# Patient Record
Sex: MALE
[De-identification: names, ages, dates, MRNs, and addresses within clinical notes are randomized per-mention and may not be internally consistent; named-entity substitution may affect disease eponyms.]

---

## 2017-01-24 ENCOUNTER — APPOINTMENT (OUTPATIENT)
Dept: ENDOCRINOLOGY | Facility: CLINIC | Age: 52
End: 2017-01-24

## 2017-01-24 VITALS
HEIGHT: 60 IN | BODY MASS INDEX: 27.29 KG/M2 | HEART RATE: 75 BPM | WEIGHT: 139 LBS | SYSTOLIC BLOOD PRESSURE: 127 MMHG | DIASTOLIC BLOOD PRESSURE: 83 MMHG

## 2017-01-24 DIAGNOSIS — C73 MALIGNANT NEOPLASM OF THYROID GLAND: ICD-10-CM

## 2017-01-24 DIAGNOSIS — E89.0 POSTPROCEDURAL HYPOTHYROIDISM: ICD-10-CM

## 2017-01-24 DIAGNOSIS — Z82.49 FAMILY HISTORY OF ISCHEMIC HEART DISEASE AND OTHER DISEASES OF THE CIRCULATORY SYSTEM: ICD-10-CM

## 2017-01-26 LAB
ALBUMIN SERPL ELPH-MCNC: 4.7 G/DL
ALP BLD-CCNC: 76 U/L
ALT SERPL-CCNC: 22 U/L
ANION GAP SERPL CALC-SCNC: 16 MMOL/L
AST SERPL-CCNC: 17 U/L
BILIRUB SERPL-MCNC: 0.7 MG/DL
BUN SERPL-MCNC: 14 MG/DL
CALCIUM SERPL-MCNC: 9.7 MG/DL
CHLORIDE SERPL-SCNC: 100 MMOL/L
CHOLEST SERPL-MCNC: 236 MG/DL
CHOLEST/HDLC SERPL: 4.9 RATIO
CO2 SERPL-SCNC: 26 MMOL/L
CREAT SERPL-MCNC: 0.93 MG/DL
GLUCOSE SERPL-MCNC: 90 MG/DL
HBA1C MFR BLD HPLC: 5.7 %
HDLC SERPL-MCNC: 48 MG/DL
LDLC SERPL CALC-MCNC: 158 MG/DL
POTASSIUM SERPL-SCNC: 4.5 MMOL/L
PROT SERPL-MCNC: 7.2 G/DL
SODIUM SERPL-SCNC: 142 MMOL/L
THYROGLOB AB SERPL-ACNC: <20 IU/ML
THYROGLOB SERPL-MCNC: <0.2 NG/ML
TRIGL SERPL-MCNC: 150 MG/DL
TSH SERPL-ACNC: 7.02 UIU/ML

## 2017-05-09 ENCOUNTER — MEDICATION RENEWAL (OUTPATIENT)
Age: 52
End: 2017-05-09

## 2018-04-30 ENCOUNTER — RX RENEWAL (OUTPATIENT)
Age: 53
End: 2018-04-30

## 2018-06-12 ENCOUNTER — APPOINTMENT (OUTPATIENT)
Dept: ENDOCRINOLOGY | Facility: CLINIC | Age: 53
End: 2018-06-12
Payer: COMMERCIAL

## 2018-06-12 VITALS
HEIGHT: 60 IN | SYSTOLIC BLOOD PRESSURE: 124 MMHG | BODY MASS INDEX: 27.48 KG/M2 | DIASTOLIC BLOOD PRESSURE: 86 MMHG | HEART RATE: 73 BPM | WEIGHT: 140 LBS

## 2018-06-12 PROCEDURE — 99213 OFFICE O/P EST LOW 20 MIN: CPT

## 2018-06-14 LAB
THYROGLOB AB SERPL-ACNC: <20 IU/ML
THYROGLOB SERPL-MCNC: <0.2 NG/ML
TSH SERPL-ACNC: 5.23 UIU/ML

## 2019-10-03 ENCOUNTER — APPOINTMENT (OUTPATIENT)
Dept: ENDOCRINOLOGY | Facility: CLINIC | Age: 54
End: 2019-10-03
Payer: COMMERCIAL

## 2019-10-03 VITALS
DIASTOLIC BLOOD PRESSURE: 64 MMHG | WEIGHT: 142 LBS | BODY MASS INDEX: 27.88 KG/M2 | HEART RATE: 66 BPM | SYSTOLIC BLOOD PRESSURE: 107 MMHG | HEIGHT: 60 IN

## 2019-10-03 DIAGNOSIS — E78.00 PURE HYPERCHOLESTEROLEMIA, UNSPECIFIED: ICD-10-CM

## 2019-10-03 PROCEDURE — 99213 OFFICE O/P EST LOW 20 MIN: CPT

## 2019-10-03 RX ORDER — ROSUVASTATIN CALCIUM 20 MG/1
20 TABLET, FILM COATED ORAL
Qty: 90 | Refills: 0 | Status: ACTIVE | COMMUNITY
Start: 2019-09-05

## 2019-10-03 NOTE — ASSESSMENT
[FreeTextEntry1] : Hypothyroid, h/o thyroid cancer, likely cured, Low risk for recurrence. Does not need TSH suppression.\par Explained that hypothyroidism can contribute to hyperlipidemia, but when TSH is over 10, and his TSH has never been that high.  goal TSH 0.5-4.0 range, will adjust dose, if necessary.\par Hyperlipidemia,  will check lipids (Statin dose increased)\par RTO 1 year\par

## 2019-10-03 NOTE — PHYSICAL EXAM
[Alert] : alert [Healthy Appearance] : healthy appearance [Normal Voice/Communication] : normal voice communication [No Proptosis] : no proptosis [No Lid Lag] : no lid lag [Normal Hearing] : hearing was normal [No LAD] : no lymphadenopathy [Clear to Auscultation] : lungs were clear to auscultation bilaterally [Normal S1, S2] : normal S1 and S2 [Regular Rhythm] : with a regular rhythm [No Tremors] : no tremors [Normal Affect] : the affect was normal [Normal Mood] : the mood was normal [de-identified] : thyroid not palpable [de-identified] : mild acanthosis nigricans

## 2019-10-03 NOTE — HISTORY OF PRESENT ILLNESS
[Date: ______] : [unfilled] [ALEMAN] : patient was treated with radioactive iodine [FreeTextEntry1] : no health issues since last visit.  \par statin dose was increased.  he goes for stress test and calcium score every five years\par exercising/walking during his commute and then more exercise on weekends\par finding that a nap in the afternoon feels good\par weight has been stable\par no palpitations or feeling jittery \par declines flu vaccine\par \par \par Meds: levothyroxine 150mcg daily\par rosuvastatin 20mg [de-identified] : follicular carcinoma, capsular and vascular invasion present. 5 x 4.4 x 1.5 cm [de-identified] : left hemithyroidectomy [de-identified] : completion thyroidectomy [de-identified] : 1mm, incidental papillary [de-identified] : 4/2011 [de-identified] : 100 [de-identified] : \par 12/12: Tg < 0.20, Tg Ab < 20, TSH 13.61\par 10/13: Tg 0.31, Tg Ab < 20, TSH < 0.01\par 12/14: Tg < 0.20, Tg Ab < 20, TSH 3.22\par 10/15: Tg < 0.20, Tg Ab < 20, TSH 2.797\par 1/17: Tg < 0.20, Tg Ab , 20, TSH 7.02\par 6/18: Tg < 0.20, Tg Ab , 20< TSH 5.23 [de-identified] : \par post ALEMAN scan: focal uptake in neck c/w remnant tissue and no appreciable uptake elsewhere

## 2019-10-04 LAB
ANION GAP SERPL CALC-SCNC: 12 MMOL/L
BUN SERPL-MCNC: 11 MG/DL
CALCIUM SERPL-MCNC: 9.8 MG/DL
CHLORIDE SERPL-SCNC: 103 MMOL/L
CHOLEST SERPL-MCNC: 133 MG/DL
CHOLEST/HDLC SERPL: 3 RATIO
CO2 SERPL-SCNC: 28 MMOL/L
CREAT SERPL-MCNC: 0.9 MG/DL
GLUCOSE SERPL-MCNC: 96 MG/DL
HDLC SERPL-MCNC: 44 MG/DL
LDLC SERPL CALC-MCNC: 69 MG/DL
POTASSIUM SERPL-SCNC: 5 MMOL/L
SODIUM SERPL-SCNC: 143 MMOL/L
THYROGLOB AB SERPL-ACNC: <20 IU/ML
THYROGLOB SERPL-MCNC: <0.2 NG/ML
TRIGL SERPL-MCNC: 98 MG/DL
TSH SERPL-ACNC: 1.35 UIU/ML

## 2019-10-04 RX ORDER — PRAVASTATIN SODIUM 10 MG/1
10 TABLET ORAL
Refills: 0 | Status: DISCONTINUED | COMMUNITY
End: 2019-10-04

## 2020-09-21 ENCOUNTER — RX RENEWAL (OUTPATIENT)
Age: 55
End: 2020-09-21

## 2020-11-10 ENCOUNTER — APPOINTMENT (OUTPATIENT)
Dept: ENDOCRINOLOGY | Facility: CLINIC | Age: 55
End: 2020-11-10
Payer: COMMERCIAL

## 2020-11-10 VITALS — BODY MASS INDEX: 26.95 KG/M2 | WEIGHT: 138 LBS

## 2020-11-10 PROCEDURE — 99213 OFFICE O/P EST LOW 20 MIN: CPT | Mod: 95

## 2020-11-10 NOTE — DATA REVIEWED
[FreeTextEntry1] : 10/19: TSH 1.35, tot chol 133, trig 98, HDL 44, LDL 69, Tg < 0.20, Tg Ab , 20 \par 6/18: TSH 5.23, Tg < 0.20, Tg Ab , 20 \par 1/17: TSH 7.02, Tg < 0.20, Tg  Ab < 20, A1c 5.7%

## 2020-11-10 NOTE — ASSESSMENT
[FreeTextEntry1] : Hypothyroid, h/o thyroid cancer, likely cured, Low risk for recurrence. Does not need TSH suppression.\par goal TSH 0.5-3.5 range.\par pt wlll have labs from primary doctor forwarded to me.\par RTO 1 year\par

## 2020-11-10 NOTE — HISTORY OF PRESENT ILLNESS
[Home] : at home, [unfilled] , at the time of the visit. [Medical Office: ([facility])___] : at the medical office located in  [Verbal consent obtained from patient] : the patient, [unfilled] [ALEMAN] : patient was treated with radioactive iodine [FreeTextEntry1] : no health issues since last visit.  \par running and walking more during pandemic, [place]redrick in summer\par felt weight was increasing, so then started exercising more.  thinks weight is 138 lb\par no palpitations or feeling jittery \par skin is drier in the winter\par will get labs with Dr Petit next week\par got flu vaccine\par \par Meds: levothyroxine 150mcg daily\par rosuvastatin 20mg [de-identified] : 12/2010 [de-identified] : L hemithyroidectomy [de-identified] : follicular carcinoma, capsular and vascular invasion present. 5 x 4.4 x 1.5 cm  [de-identified] : 2/2011.  completion thyroidectomy.  Path: 1mm incidental papillary [de-identified] : 4/2011 [de-identified] : 100 [de-identified] : \par post ALEMAN scan: focal uptake in neck c/w remnant tissue and no appreciable uptake elsewhere.  [de-identified] : \par 12/12: Tg < 0.20, Tg Ab < 20, TSH 13.61\par 10/13: Tg 0.31, Tg Ab < 20, TSH < 0.01\par 12/14: Tg < 0.20, Tg Ab < 20, TSH 3.22\par 10/15: Tg < 0.20, Tg Ab < 20, TSH 2.797\par 1/17: Tg < 0.20, Tg Ab , 20, TSH 7.02\par 6/18: Tg < 0.20, Tg Ab , 20,  TSH 5.23 \par 10/19: Tg < 0.20, Tg Ab , 20, TSH 1.35\par  \par

## 2020-12-15 ENCOUNTER — RX RENEWAL (OUTPATIENT)
Age: 55
End: 2020-12-15

## 2021-12-03 ENCOUNTER — RX RENEWAL (OUTPATIENT)
Age: 56
End: 2021-12-03

## 2021-12-29 ENCOUNTER — APPOINTMENT (OUTPATIENT)
Dept: ENDOCRINOLOGY | Facility: CLINIC | Age: 56
End: 2021-12-29
Payer: COMMERCIAL

## 2021-12-29 VITALS — WEIGHT: 142 LBS | BODY MASS INDEX: 27.73 KG/M2

## 2021-12-29 PROCEDURE — 99213 OFFICE O/P EST LOW 20 MIN: CPT | Mod: 95

## 2021-12-29 NOTE — DATA REVIEWED
[FreeTextEntry1] : 11/21: TSH 8.13, free T4 1.22, tot chol 175, trig 116, HDL 50, , A1c 5.6%\par 11/20: TSH 19.7, free T4 1.20, tot chol 180, trig 151, HDL 45, , A1c 5.7%\par 10/19: TSH 1.35, tot chol 133, trig 98, HDL 44, LDL 69, Tg < 0.20, Tg Ab , 20 \par 6/18: TSH 5.23, Tg < 0.20, Tg Ab , 20 \par 1/17: TSH 7.02, Tg < 0.20, Tg  Ab < 20, A1c 5.7%

## 2021-12-29 NOTE — REASON FOR VISIT
[Home] : at home, [unfilled] , at the time of the visit. [Medical Office: ([facility])___] : at the medical office located in  [Verbal consent obtained from patient] : the patient, [unfilled] [Follow - Up] : a follow-up visit [Hypothyroidism] : hypothyroidism

## 2021-12-29 NOTE — ASSESSMENT
[FreeTextEntry1] : Hypothyroid, h/o thyroid cancer, likely cured, Low risk for recurrence. Does not need TSH suppression.\par Not clear why TSH is out of range\par Increase thyroxine dose to 8 tab/week, and will recheck, with Tg panel, in 2 months (will send [facility] form)\par RTO 1 year

## 2021-12-29 NOTE — HISTORY OF PRESENT ILLNESS
[FreeTextEntry1] : no health issues since last visit.  \par labs reviewed from 11/21 by PCP :  elevated TSH but normal free T4\par Pt doesn't have any hypothyroid symptoms.  \par Still running 1.5-2 miles and doing stairs, no POWELL\par weight is stable, 143-144 lb\par not taking any PPI.  Taking vitamin D but no other vitamins.\par still working from home.\par probably missed a few doses but generally remembers to take thyroxine every day.\par got flu vaccine and Covid booster\par \par Meds: levothyroxine 150mcg daily\par rosuvastatin 20mg\par vitamin D 5000 IU/day [ALEMAN] : patient was treated with radioactive iodine [de-identified] : 12/2010 [de-identified] : L hemithyroidectomy [de-identified] : follicular carcinoma, capsular and vascular invasion present. 5 x 4.4 x 1.5 cm  [de-identified] : 2/2011.  completion thyroidectomy.  Path: 1mm incidental papillary [de-identified] : 4/2011 [de-identified] : 100 [de-identified] : \par post ALEMAN scan: focal uptake in neck c/w remnant tissue and no appreciable uptake elsewhere.  [de-identified] : \par 12/12: Tg < 0.20, Tg Ab < 20, TSH 13.61\par 10/13: Tg 0.31, Tg Ab < 20, TSH < 0.01\par 12/14: Tg < 0.20, Tg Ab < 20, TSH 3.22\par 10/15: Tg < 0.20, Tg Ab < 20, TSH 2.797\par 1/17: Tg < 0.20, Tg Ab , 20, TSH 7.02\par 6/18: Tg < 0.20, Tg Ab < 20,  TSH 5.23 \par 10/19: Tg < 0.20, Tg Ab < 20, TSH 1.35\par  \par

## 2023-02-13 ENCOUNTER — RX RENEWAL (OUTPATIENT)
Age: 58
End: 2023-02-13

## 2023-03-01 ENCOUNTER — APPOINTMENT (OUTPATIENT)
Dept: ENDOCRINOLOGY | Facility: CLINIC | Age: 58
End: 2023-03-01

## 2024-01-24 ENCOUNTER — APPOINTMENT (OUTPATIENT)
Dept: ENDOCRINOLOGY | Facility: CLINIC | Age: 59
End: 2024-01-24
Payer: COMMERCIAL

## 2024-01-24 VITALS
HEART RATE: 71 BPM | SYSTOLIC BLOOD PRESSURE: 130 MMHG | DIASTOLIC BLOOD PRESSURE: 87 MMHG | WEIGHT: 139 LBS | BODY MASS INDEX: 27.15 KG/M2

## 2024-01-24 PROCEDURE — 99213 OFFICE O/P EST LOW 20 MIN: CPT

## 2024-01-24 NOTE — HISTORY OF PRESENT ILLNESS
[FreeTextEntry1] : Last here Dec 2021. no health issues since last visit. Exercising 3x/week-- running and the other days, walks 30-45 min He did treadmill stress test in 2022 and that was good. Lips and skin are drier than before.  Needs to use Aquaphor, drinks a lot of water. Needs to get up during the night to urinate, which he didn't do before.  Meds: levothyroxine 150mcg daily rosuvastatin 20mg vitamin D 5000 IU/day [ALEMAN] : patient was treated with radioactive iodine [de-identified] : 12/2010 [de-identified] : L hemithyroidectomy [de-identified] : follicular carcinoma, capsular and vascular invasion present. 5 x 4.4 x 1.5 cm  [de-identified] : 2/2011.  completion thyroidectomy.  Path: 1mm incidental papillary [de-identified] : 4/2011 [de-identified] : 100 [de-identified] : \par  post ALEMAN scan: focal uptake in neck c/w remnant tissue and no appreciable uptake elsewhere.  [de-identified] : \par  12/12: Tg < 0.20, Tg Ab < 20, TSH 13.61\par  10/13: Tg 0.31, Tg Ab < 20, TSH < 0.01\par  12/14: Tg < 0.20, Tg Ab < 20, TSH 3.22\par  10/15: Tg < 0.20, Tg Ab < 20, TSH 2.797\par  1/17: Tg < 0.20, Tg Ab , 20, TSH 7.02\par  6/18: Tg < 0.20, Tg Ab < 20,  TSH 5.23 \par  10/19: Tg < 0.20, Tg Ab < 20, TSH 1.35\par   \par

## 2024-01-24 NOTE — PHYSICAL EXAM
[Alert] : alert [No Acute Distress] : no acute distress [No Proptosis] : no proptosis [No Lid Lag] : no lid lag [Normal Hearing] : hearing was normal [No LAD] : no lymphadenopathy [Well Healed Scar] : well healed scar [Clear to Auscultation] : lungs were clear to auscultation bilaterally [Normal S1, S2] : normal S1 and S2 [Regular Rhythm] : with a regular rhythm [Normal Affect] : the affect was normal [Normal Mood] : the mood was normal [de-identified] : thyroid not palpable

## 2024-01-24 NOTE — ASSESSMENT
[FreeTextEntry1] : Hypothyroid, h/o thyroid cancer, likely cured, Low risk for recurrence. Does not need TSH suppression.  Continue levothyroxine 150mcg/day RTO 1 year.

## 2024-05-31 RX ORDER — LEVOTHYROXINE SODIUM 0.15 MG/1
150 TABLET ORAL
Qty: 90 | Refills: 3 | Status: ACTIVE | COMMUNITY
Start: 2018-04-30 | End: 1900-01-01

## 2024-09-16 ENCOUNTER — TRANSCRIPTION ENCOUNTER (OUTPATIENT)
Age: 59
End: 2024-09-16

## 2024-09-17 ENCOUNTER — TRANSCRIPTION ENCOUNTER (OUTPATIENT)
Age: 59
End: 2024-09-17

## 2025-06-10 ENCOUNTER — APPOINTMENT (OUTPATIENT)
Dept: ENDOCRINOLOGY | Facility: CLINIC | Age: 60
End: 2025-06-10
Payer: COMMERCIAL

## 2025-06-10 VITALS
SYSTOLIC BLOOD PRESSURE: 138 MMHG | WEIGHT: 144 LBS | BODY MASS INDEX: 28.12 KG/M2 | DIASTOLIC BLOOD PRESSURE: 88 MMHG | HEART RATE: 75 BPM

## 2025-06-10 PROBLEM — R73.03 PREDIABETES: Status: ACTIVE | Noted: 2025-06-10

## 2025-06-10 PROBLEM — E55.9 VITAMIN D DEFICIENCY: Status: ACTIVE | Noted: 2025-06-10

## 2025-06-10 PROCEDURE — 99214 OFFICE O/P EST MOD 30 MIN: CPT

## 2025-09-11 ENCOUNTER — TRANSCRIPTION ENCOUNTER (OUTPATIENT)
Age: 60
End: 2025-09-11

## 2025-09-12 ENCOUNTER — TRANSCRIPTION ENCOUNTER (OUTPATIENT)
Age: 60
End: 2025-09-12